# Patient Record
(demographics unavailable — no encounter records)

---

## 2024-11-20 NOTE — ASSESSMENT
[FreeTextEntry1] : Cervical radiculopathy, acute -EMG/NCS of the upper extremity  -MRI Cervical spine without contrast -Patient declines medication at this time  -Trial Pennsaid 2 % External Solution -Physical therapy referral, if no improvement, then pain management  -Follow-up in 1 month   Shanon CHE, attest that this documentation has been prepared under the direction and in the presence of Provider Estuardo Benjamin DO   Thank you for allowing me to assist in the management of this patient.   Estuardo Benjamin DO Board Certified, Neurology.

## 2024-11-20 NOTE — HISTORY OF PRESENT ILLNESS
[FreeTextEntry1] : It is a pleasure to see Ms. MELIDA AQUINO in office today. She is a 33-year-old woman who presents today for a neurologic consultation. Since 2 weeks ago, she experienced a sudden onset of constant neck pain that shoots down to her left arm. Denies anything out of the ordinary prior to onset. This is the first time it happened. Denies numbness or weakness. She does not take any medication for the pain. She is unable to lay on her left side as it exacerbates the pain.